# Patient Record
Sex: MALE | Race: WHITE | NOT HISPANIC OR LATINO | Employment: PART TIME | URBAN - METROPOLITAN AREA
[De-identification: names, ages, dates, MRNs, and addresses within clinical notes are randomized per-mention and may not be internally consistent; named-entity substitution may affect disease eponyms.]

---

## 2017-01-10 ENCOUNTER — HOSPITAL ENCOUNTER (EMERGENCY)
Facility: HOSPITAL | Age: 32
Discharge: HOME | End: 2017-01-10
Attending: EMERGENCY MEDICINE
Payer: COMMERCIAL

## 2017-01-10 VITALS
HEART RATE: 108 BPM | WEIGHT: 145 LBS | DIASTOLIC BLOOD PRESSURE: 63 MMHG | HEIGHT: 70 IN | SYSTOLIC BLOOD PRESSURE: 120 MMHG | OXYGEN SATURATION: 98 % | BODY MASS INDEX: 20.76 KG/M2 | TEMPERATURE: 98 F | RESPIRATION RATE: 18 BRPM

## 2017-01-10 DIAGNOSIS — R10.9 ABDOMINAL PAIN: Primary | ICD-10-CM

## 2017-01-10 LAB
BILIRUB UR QL STRIP: NEGATIVE
CLARITY UR: CLEAR
COLOR UR: YELLOW
COLOR, POC: YELLOW
GLUCOSE UR STRIP-MCNC: NEGATIVE MG/DL
HGB UR QL STRIP.AUTO: NEGATIVE
KETONES UR STRIP-MCNC: ABNORMAL MG/DL
LEUKOCYTE ESTERASE UR QL STRIP: NEGATIVE
NITRITE UR QL STRIP: NEGATIVE
PH UR STRIP.AUTO: 6.5 [PH] (ref 4.5–8)
PROT UR STRIP-MCNC: NEGATIVE MG/DL
SP GR UR STRIP.AUTO: 1.02 (ref 1–1.03)
UROBILINOGEN UR QL STRIP.AUTO: 0.2 E.U./DL

## 2017-01-10 PROCEDURE — 96374 THER/PROPH/DIAG INJ IV PUSH: CPT

## 2017-01-10 PROCEDURE — 81003 URINALYSIS AUTO W/O SCOPE: CPT

## 2017-01-10 PROCEDURE — 81002 URINALYSIS NONAUTO W/O SCOPE: CPT | Performed by: EMERGENCY MEDICINE

## 2017-01-10 PROCEDURE — 96361 HYDRATE IV INFUSION ADD-ON: CPT

## 2017-01-10 PROCEDURE — 99284 EMERGENCY DEPT VISIT MOD MDM: CPT

## 2017-01-10 RX ORDER — POLYETHYLENE GLYCOL 3350 17 G/17G
17 POWDER, FOR SOLUTION ORAL DAILY
Qty: 119 G | Refills: 0 | Status: SHIPPED | OUTPATIENT
Start: 2017-01-10 | End: 2018-10-16

## 2017-01-10 RX ORDER — ONDANSETRON 2 MG/ML
4 INJECTION INTRAMUSCULAR; INTRAVENOUS ONCE
Status: COMPLETED | OUTPATIENT
Start: 2017-01-10 | End: 2017-01-10

## 2017-01-10 RX ORDER — MAGNESIUM CARB/ALUMINUM HYDROX 105-160MG
296 TABLET,CHEWABLE ORAL ONCE
Status: COMPLETED | OUTPATIENT
Start: 2017-01-10 | End: 2017-01-10

## 2017-01-10 RX ORDER — CALCIUM CARBONATE 200(500)MG
1 TABLET,CHEWABLE ORAL AS NEEDED
COMMUNITY
End: 2019-01-16 | Stop reason: ALTCHOICE

## 2017-01-10 RX ADMIN — SODIUM CHLORIDE 1000 ML: 0.9 INJECTION, SOLUTION INTRAVENOUS at 06:09

## 2017-01-10 RX ADMIN — ONDANSETRON 4 MG: 2 INJECTION INTRAMUSCULAR; INTRAVENOUS at 06:08

## 2017-01-10 RX ADMIN — MAGNESIUM CITRATE 296 ML: 1.75 LIQUID ORAL at 07:12

## 2017-09-28 ENCOUNTER — ALLSCRIPTS OFFICE VISIT (OUTPATIENT)
Dept: OTHER | Facility: OTHER | Age: 32
End: 2017-09-28

## 2017-09-28 DIAGNOSIS — Z00.00 ENCOUNTER FOR GENERAL ADULT MEDICAL EXAMINATION WITHOUT ABNORMAL FINDINGS: ICD-10-CM

## 2017-09-28 LAB
BILIRUB UR QL STRIP: NORMAL
CLARITY UR: NORMAL
COLOR UR: YELLOW
GLUCOSE (HISTORICAL): NORMAL
HGB UR QL STRIP.AUTO: NORMAL
KETONES UR STRIP-MCNC: NORMAL MG/DL
LEUKOCYTE ESTERASE UR QL STRIP: NORMAL
NITRITE UR QL STRIP: NORMAL
PH UR STRIP.AUTO: 0.5 [PH]
PROT UR STRIP-MCNC: NORMAL MG/DL
SP GR UR STRIP.AUTO: 1
UROBILINOGEN UR QL STRIP.AUTO: 0.2

## 2017-10-04 ENCOUNTER — LAB CONVERSION - ENCOUNTER (OUTPATIENT)
Dept: OTHER | Facility: OTHER | Age: 32
End: 2017-10-04

## 2017-10-04 ENCOUNTER — GENERIC CONVERSION - ENCOUNTER (OUTPATIENT)
Dept: OTHER | Facility: OTHER | Age: 32
End: 2017-10-04

## 2017-10-04 LAB
A/G RATIO (HISTORICAL): 1.9 (CALC) (ref 1–2.5)
ALBUMIN SERPL BCP-MCNC: 4.8 G/DL (ref 3.6–5.1)
ALP SERPL-CCNC: 45 U/L (ref 40–115)
ALT SERPL W P-5'-P-CCNC: 29 U/L (ref 9–46)
AST SERPL W P-5'-P-CCNC: 23 U/L (ref 10–40)
BASOPHILS # BLD AUTO: 0.6 %
BASOPHILS # BLD AUTO: 38 CELLS/UL (ref 0–200)
BILIRUB SERPL-MCNC: 0.7 MG/DL (ref 0.2–1.2)
BUN SERPL-MCNC: 19 MG/DL (ref 7–25)
BUN/CREA RATIO (HISTORICAL): NORMAL (CALC) (ref 6–22)
CALCIUM SERPL-MCNC: 10.1 MG/DL (ref 8.6–10.3)
CHLORIDE SERPL-SCNC: 101 MMOL/L (ref 98–110)
CHOLEST SERPL-MCNC: 168 MG/DL
CHOLEST/HDLC SERPL: 2.6 (CALC)
CO2 SERPL-SCNC: 27 MMOL/L (ref 20–31)
CREAT SERPL-MCNC: 0.86 MG/DL (ref 0.6–1.35)
DEPRECATED RDW RBC AUTO: 12 % (ref 11–15)
EGFR AFRICAN AMERICAN (HISTORICAL): 136 ML/MIN/1.73M2
EGFR-AMERICAN CALC (HISTORICAL): 117 ML/MIN/1.73M2
EOSINOPHIL # BLD AUTO: 1.9 %
EOSINOPHIL # BLD AUTO: 120 CELLS/UL (ref 15–500)
GAMMA GLOBULIN (HISTORICAL): 2.5 G/DL (CALC) (ref 1.9–3.7)
GLUCOSE (HISTORICAL): 96 MG/DL (ref 65–99)
HCT VFR BLD AUTO: 44.4 % (ref 38.5–50)
HDLC SERPL-MCNC: 64 MG/DL
HGB BLD-MCNC: 15.2 G/DL (ref 13.2–17.1)
LDL CHOLESTEROL (HISTORICAL): 87 MG/DL (CALC)
LYMPHOCYTES # BLD AUTO: 1701 CELLS/UL (ref 850–3900)
LYMPHOCYTES # BLD AUTO: 27 %
MCH RBC QN AUTO: 30.3 PG (ref 27–33)
MCHC RBC AUTO-ENTMCNC: 34.2 G/DL (ref 32–36)
MCV RBC AUTO: 88.6 FL (ref 80–100)
MONOCYTES # BLD AUTO: 580 CELLS/UL (ref 200–950)
MONOCYTES (HISTORICAL): 9.2 %
NEUTROPHILS # BLD AUTO: 3862 CELLS/UL (ref 1500–7800)
NEUTROPHILS # BLD AUTO: 61.3 %
NON-HDL-CHOL (CHOL-HDL) (HISTORICAL): 104 MG/DL (CALC)
PLATELET # BLD AUTO: 239 THOUSAND/UL (ref 140–400)
PMV BLD AUTO: 11.7 FL (ref 7.5–12.5)
POTASSIUM SERPL-SCNC: 4.1 MMOL/L (ref 3.5–5.3)
RBC # BLD AUTO: 5.01 MILLION/UL (ref 4.2–5.8)
SODIUM SERPL-SCNC: 138 MMOL/L (ref 135–146)
TOTAL PROTEIN (HISTORICAL): 7.3 G/DL (ref 6.1–8.1)
TRIGL SERPL-MCNC: 79 MG/DL
TSH SERPL DL<=0.05 MIU/L-ACNC: 1.51 MIU/L (ref 0.4–4.5)
WBC # BLD AUTO: 6.3 THOUSAND/UL (ref 3.8–10.8)

## 2018-01-14 VITALS
WEIGHT: 148 LBS | TEMPERATURE: 97.6 F | SYSTOLIC BLOOD PRESSURE: 132 MMHG | DIASTOLIC BLOOD PRESSURE: 80 MMHG | HEART RATE: 72 BPM | RESPIRATION RATE: 16 BRPM | BODY MASS INDEX: 21.19 KG/M2 | HEIGHT: 70 IN

## 2018-01-16 NOTE — RESULT NOTES
Discussion/Summary   All recent blood test was normal     Verified Results  (1) LIPID PANEL, FASTING [id number] 05:14HV [facility]     Test Name Result Flag Reference   CHOLESTEROL, TOTAL 168 mg/dL  <200   HDL CHOLESTEROL 64 mg/dL  >92   TRIGLICERIDES 79 mg/dL  <067   LDL-CHOLESTEROL 87 mg/dL (calc)     Reference range: <100     Desirable range <100 mg/dL for patients with CHD or  diabetes and <70 mg/dL for diabetic patients with  known heart disease  LDL-C is now calculated using the Abhilash-Waite   calculation, which is a validated novel method providing   better accuracy than the Friedewald equation in the   estimation of LDL-C  Annette Thakur  Henrene Batman  3177;339(62): 4380-1783   (http://[employer]/faq/QRV951)   CHOL/HDLC RATIO 2 6 (calc)  <5 0   NON HDL CHOLESTEROL 104 mg/dL (calc)  <130   For patients with diabetes plus 1 major ASCVD risk   factor, treating to a non-HDL-C goal of <100 mg/dL   (LDL-C of <70 mg/dL) is considered a therapeutic   option  (1) COMPREHENSIVE METABOLIC PANEL [id number] 78:95OI [facility]     Test Name Result Flag Reference   GLUCOSE 96 mg/dL  65-99   Fasting reference interval   UREA NITROGEN (BUN) 19 mg/dL  7-25   CREATININE 0 86 mg/dL  0 60-1 35   eGFR NON-AFR   AMERICAN 117 mL/min/1 73m2  > OR = 60   eGFR AFRICAN AMERICAN 136 mL/min/1 73m2  > OR = 60   BUN/CREATININE RATIO   7-50   NOT APPLICABLE (calc)   SODIUM 138 mmol/L  135-146   POTASSIUM 4 1 mmol/L  3 5-5 3   CHLORIDE 101 mmol/L     CARBON DIOXIDE 27 mmol/L  20-31   CALCIUM 10 1 mg/dL  8 6-10 3   PROTEIN, TOTAL 7 3 g/dL  6 1-8 1   ALBUMIN 4 8 g/dL  3 6-5 1   GLOBULIN 2 5 g/dL (calc)  1 9-3 7   ALBUMIN/GLOBULIN RATIO 1 9 (calc)  1 0-2 5   BILIRUBIN, TOTAL 0 7 mg/dL  0 2-1 2   ALKALINE PHOSPHATASE 45 U/L     AST 23 U/L  10-40   ALT 29 U/L  9-46     (1) CBC/PLT/DIFF [id number] 01:63QT [facility]     Test Name Result Flag Reference   WHITE BLOOD CELL COUNT 6 3 Thousand/uL  3 8-10 8 RED BLOOD CELL COUNT 5 01 Million/uL  4 20-5 80   HEMOGLOBIN 15 2 g/dL  13 2-17 1   HEMATOCRIT 44 4 %  38 5-50 0   MCV 88 6 fL  80 0-100 0   MCH 30 3 pg  27 0-33 0   MCHC 34 2 g/dL  32 0-36 0   RDW 12 0 %  11 0-15 0   PLATELET COUNT 646 Thousand/uL  140-400   ABSOLUTE NEUTROPHILS 3862 cells/uL  7341-6966   ABSOLUTE LYMPHOCYTES 1701 cells/uL  850-3900   ABSOLUTE MONOCYTES 580 cells/uL  200-950   ABSOLUTE EOSINOPHILS 120 cells/uL     ABSOLUTE BASOPHILS 38 cells/uL  0-200   NEUTROPHILS 61 3 %     LYMPHOCYTES 27 0 %     MONOCYTES 9 2 %     EOSINOPHILS 1 9 %     BASOPHILS 0 6 %     MPV 11 7 fL  7 5-12 5     (Q) TSH, 3RD GENERATION [id number] 04:36BN Tye Watkins   REPORT COMMENT:  FASTING:YES     Test Name Result Flag Reference   TSH 1 51 mIU/L  0 40-4 50

## 2018-01-17 NOTE — PROGRESS NOTES
Assessment    1  Encounter for preventive health examination (V70 0) (Z00 00)    Plan  Health Maintenance    · (1) CBC/PLT/DIFF; Status:Active; Requested for:[id number];    · (1) COMPREHENSIVE METABOLIC PANEL; Status:Active; Requested for:15Vrb1110;    · (1) LIPID PANEL, FASTING; Status:Active; Requested for:18Xxf9715;    · (1) TSH; Status:Active; Requested for:28Hla0958;    · Urine Dip Automated- POC; Status:Complete;   Done: 49PNY4873 01:01PM    Discussion/Summary    Well adult  Patient appears to be in stable health  He will obtain blood work for further evaluation  We'll make further recommendations pending results of tests  Chief Complaint  Patient is here for Physical Exam  Patient has no current medications  History of Present Illness  HPI: Patient denies any recent illness  He does not obtain a regular form of exercise but does have a physical job working for [employer] that involved a lot of lifting and standing and walking  Review of Systems    Constitutional: No fever or chills, feels well, no tiredness, no recent weight gain or weight loss  Eyes: No complaints of eye pain, no red eyes, no discharge from eyes, no itchy eyes  ENT: no complaints of earache, no hearing loss, no nosebleeds, no nasal discharge, no sore throat, no hoarseness  Cardiovascular: No complaints of slow heart rate, no fast heart rate, no chest pain, no palpitations, no leg claudication, no lower extremity  Respiratory: No complaints of shortness of breath, no wheezing, no cough, no SOB on exertion, no orthopnea or PND  Gastrointestinal: No complaints of abdominal pain, no constipation, no nausea or vomiting, no diarrhea or bloody stools  Genitourinary: No complaints of dysuria, no incontinence, no hesitancy, no nocturia, no genital lesion, no testicular pain  Musculoskeletal: No complaints of arthralgia, no myalgias, no joint swelling or stiffness, no limb pain or swelling     Integumentary: No complaints of skin rash or skin lesions, no itching, no skin wound, no dry skin  Active Problems    1  Acute sinusitis (461 9) (J01 90)   2  Acute upper respiratory infection (465 9) (J06 9)   3  Paresthesia (782 0) (R20 2)   4  Right wrist pain (719 43) (M25 531)    Family History  Mother    · Family history of Living and Healthy  Father    · Family history of Living and Healthy  Maternal Grandmother    · Family history of Cancer  Paternal Grandmother    · Family history of Diabetes Mellitus (V18 0)    Social History    · Never a smoker    Allergies    1  No Known Drug Allergies    Vitals   Recorded: 19GIM0767 12:59PM   Temperature 97 6 F   Heart Rate 72   Respiration 16   Systolic 902   Diastolic 80   Height 5 ft 10 in   Weight 148 lb    BMI Calculated 21 24   BSA Calculated 1 84     Physical Exam    Constitutional   General appearance: No acute distress, well appearing and well nourished  Ears, Nose, Mouth, and Throat   External inspection of ears and nose: Normal     Otoscopic examination: Tympanic membranes translucent with normal light reflex  Canals patent without erythema  Hearing: Normal     Oropharynx: Normal with no erythema, edema, exudate or lesions  Pulmonary   Respiratory effort: No increased work of breathing or signs of respiratory distress  Auscultation of lungs: Clear to auscultation  Cardiovascular   Auscultation of heart: Normal rate and rhythm, normal S1 and S2, no murmurs  Carotid pulses: 2+ bilaterally  Examination of extremities for edema and/or varicosities: Normal     Abdomen   Abdomen: Non-tender, no masses  Liver and spleen: No hepatomegaly or splenomegaly  Lymphatic   Palpation of lymph nodes in neck: No lymphadenopathy  Musculoskeletal   Gait and station: Normal     Skin   Skin and subcutaneous tissue: Normal without rashes or lesions         Results/Data  Urine Dip Automated- POC [id number] 27:85CZ Sergey Lees     Test Name Result Flag Reference   Color Yellow Clarity Transparent     Leukocytes neg     Nitrite neg     Blood neg     Bilirubin neg     Urobilinogen 0 2     Protein neg     Ph 0 5     Specific Gravity 1 000     Ketone neg     Glucose neg         Signatures   Electronically signed by : AURELIA Hills ; Sep 28 0395  1:18PM EST                       (Author)

## 2018-10-16 ENCOUNTER — OFFICE VISIT (OUTPATIENT)
Dept: FAMILY MEDICINE CLINIC | Facility: CLINIC | Age: 33
End: 2018-10-16
Payer: COMMERCIAL

## 2018-10-16 VITALS
HEART RATE: 60 BPM | SYSTOLIC BLOOD PRESSURE: 90 MMHG | DIASTOLIC BLOOD PRESSURE: 56 MMHG | HEIGHT: 70 IN | RESPIRATION RATE: 16 BRPM | TEMPERATURE: 97.7 F | WEIGHT: 139.4 LBS | BODY MASS INDEX: 19.96 KG/M2

## 2018-10-16 DIAGNOSIS — L65.9 HAIR LOSS: Primary | ICD-10-CM

## 2018-10-16 PROCEDURE — 1036F TOBACCO NON-USER: CPT | Performed by: FAMILY MEDICINE

## 2018-10-16 PROCEDURE — 3008F BODY MASS INDEX DOCD: CPT | Performed by: FAMILY MEDICINE

## 2018-10-16 PROCEDURE — 99212 OFFICE O/P EST SF 10 MIN: CPT | Performed by: FAMILY MEDICINE

## 2018-10-16 NOTE — PROGRESS NOTES
FAMILY PRACTICE OFFICE VISIT       NAME: Nohemy Alejandro  AGE: 29 y o  SEX: male       : 1985        MRN: [id number]    DATE: 10/16/2018  TIME: 3:38 PM    Assessment and Plan     Problem List Items Addressed This Visit     Hair loss - Primary    Relevant Orders    CBC    Comprehensive metabolic panel    TSH, 3rd generation    Sedimentation rate, automated    Vitamin B12        I had a long discussion with the patient regarding possible hair loss  He will obtain blood work as ordered for further evaluation  If blood test was within normal limits he was referred to Dr Abe Vaughn for dermatologic consultation  He was also given suggestion to try over-the-counter minoxidil shampoo  There are no Patient Instructions on file for this visit  Chief Complaint     Chief Complaint   Patient presents with    Alopecia     Patient is here c/o hair loss x's 1+ yrs  History of Present Illness     Patient has noticed several weeks to months history of thinning hair especially along his hairline  He states occasionally he gets some scalp itching but nothing on a regular basis  He has tried several over-the-counter shampoos with no relief in symptoms  He has not had any recent stressors in his life to attribute to the hair loss  He takes a multivitamin once daily over-the-counter but no other prescription medicines  Review of Systems   Review of Systems   Constitutional: Negative  HENT: Negative  Respiratory: Negative  Cardiovascular: Negative  Skin:        As per HPI       Active Problem List     Patient Active Problem List   Diagnosis    Hair loss       Past Medical History:  No past medical history on file  Past Surgical History:  No past surgical history on file      Family History:  Family History   Problem Relation Age of Onset    No Known Problems Mother     No Known Problems Father     Cancer Maternal Grandmother     Diabetes Paternal Grandmother Social History:  Social History     Social History    Marital status: Single     Spouse name: N/A    Number of children: N/A    Years of education: N/A     Occupational History     [employer]     Social History Main Topics    Smoking status: Never Smoker    Smokeless tobacco: Never Used    Alcohol use Yes      Comment: social    Drug use: Yes     Types: Marijuana    Sexual activity: Not on file     Other Topics Concern    Not on file     Social History Narrative    No narrative on file       Objective     Vitals:    10/16/18 1448   BP: 90/56   Pulse: 60   Resp: 16   Temp: 97 7 °F (36 5 °C)     Wt Readings from Last 3 Encounters:   10/16/18 63 2 kg (139 lb 6 4 oz)   09/28/17 67 1 kg (148 lb)   01/10/17 65 8 kg (145 lb)       Physical Exam   Constitutional: No distress  Skin:   Patient's scalp appear to be within normal limits with no dryness or lesions noted  He does have very long hair past shoulders however he does have thinning hair along the hairline anteriorly and bilateral temporal areas         Pertinent Laboratory/Diagnostic Studies:  Lab Results   Component Value Date    BUN 19 10/03/2017    CREATININE 0 86 10/03/2017    CALCIUM 10 1 10/03/2017     10/03/2017    K 4 1 10/03/2017    CO2 27 10/03/2017     10/03/2017     Lab Results   Component Value Date    ALT 29 10/03/2017    AST 23 10/03/2017    ALKPHOS 45 10/03/2017    BILITOT 0 7 10/03/2017       Lab Results   Component Value Date    WBC 6 3 10/03/2017    HGB 15 2 10/03/2017    HCT 44 4 10/03/2017    MCV 88 6 10/03/2017     10/03/2017       No results found for: TSH    Lab Results   Component Value Date    CHOL 168 10/03/2017     Lab Results   Component Value Date    TRIG 79 10/03/2017     Lab Results   Component Value Date    HDL 64 10/03/2017     No results found for: LDLCALC  No results found for: HGBA1C    Results for orders placed or performed in visit on 10/04/17   TSH, 3RD GENERATION (HISTORICAL)   Result Value Ref Range    TSH 3RD GENERATON 1 51 0 40 - 4 50 mIU/L   COMPREHENSIVE METABOLIC PANEL (HISTORICAL)   Result Value Ref Range    Glucose 96 65 - 99 mg/dL    BUN 19 7 - 25 mg/dL    Creatinine 0 86 0 60 - 1 35 mg/dL    EGFR-AMERICAN CALC 117 > OR = 60 mL/min/1 73m2    eGFR  136 > OR = 60 mL/min/1 73m2    BUN/CREA Ratio NOT APPLICABLE 6 - 22 (calc)    Sodium 138 135 - 146 mmol/L    Potassium 4 1 3 5 - 5 3 mmol/L    Chloride 101 98 - 110 mmol/L    CO2 27 20 - 31 mmol/L    Calcium 10 1 8 6 - 10 3 mg/dL    Total Protein 7 3 6 1 - 8 1 g/dL    Albumin 4 8 3 6 - 5 1 g/dL    GAMMA GLOBULIN 2 5 1 9 - 3 7 g/dL (calc)    A/G RATIO 1 9 1 0 - 2 5 (calc)    Total Bilirubin 0 7 0 2 - 1 2 mg/dL    Alkaline Phosphatase 45 40 - 115 U/L    AST 23 10 - 40 U/L    ALT 29 9 - 46 U/L   LIPID PANEL (HISTORICAL)   Result Value Ref Range    Cholesterol 168 <200 mg/dL    HDL 64 >40 mg/dL    Triglycerides 79 <150 mg/dL    LDL CHOLESTEROL 87 mg/dL (calc)    Chol/HDL Ratio 2 6 <5 0 (calc)    NON-HDL-CHOL (CHOL-HDL) 104 <130 mg/dL (calc)   CBC AND DIFFERENTIAL (HISTORICAL)   Result Value Ref Range    WBC 6 3 3 8 - 10 8 Thousand/uL    RBC 5 01 4 20 - 5 80 Million/uL    Hemoglobin 15 2 13 2 - 17 1 g/dL    Hematocrit 44 4 38 5 - 50 0 %    MCV 88 6 80 0 - 100 0 fL    MCH 30 3 27 0 - 33 0 pg    MCHC 34 2 32 0 - 36 0 g/dL    RDW 12 0 11 0 - 15 0 %    Platelets 290 529 - 509 Thousand/uL    Neutrophils Absolute 3862 1500 - 7800 cells/uL    Lymphocytes Absolute 1701 850 - 3900 cells/uL    Monocytes Absolute 580 200 - 950 cells/uL    Eosinophils Absolute 120 15 - 500 cells/uL    Basophils Absolute 38 0 - 200 cells/uL    Neutrophils Absolute 61 3 %    Lymphocytes Absolute 27 0 %    MONOCYTES 9 2 %    Eosinophils Absolute 1 9 %    Basophils Relative 0 6 %    MPV 11 7 7 5 - 12 5 fL       Orders Placed This Encounter   Procedures    CBC    Comprehensive metabolic panel    TSH, 3rd generation    Sedimentation rate, automated    Vitamin B12 ALLERGIES:  No Known Allergies    Current Medications     Current Outpatient Prescriptions   Medication Sig Dispense Refill    calcium carbonate (TUMS) 500 mg chewable tablet Chew 1 tablet daily       No current facility-administered medications for this visit            Health Maintenance     Health Maintenance   Topic Date Due    Depression Screening PHQ  1985    INFLUENZA VACCINE  07/01/2018    DTaP,Tdap,and Td Vaccines (2 - Td) 06/01/2019     Immunization History   Administered Date(s) Administered    Tdap 68/60/0160       Elie Bailey MD

## 2018-12-07 ENCOUNTER — OFFICE VISIT (OUTPATIENT)
Dept: FAMILY MEDICINE CLINIC | Facility: CLINIC | Age: 33
End: 2018-12-07
Payer: COMMERCIAL

## 2018-12-07 VITALS
WEIGHT: 136.2 LBS | HEART RATE: 76 BPM | DIASTOLIC BLOOD PRESSURE: 62 MMHG | RESPIRATION RATE: 16 BRPM | TEMPERATURE: 98.6 F | BODY MASS INDEX: 19.5 KG/M2 | SYSTOLIC BLOOD PRESSURE: 102 MMHG | HEIGHT: 70 IN

## 2018-12-07 DIAGNOSIS — Z28.21 VACCINATION REFUSED BY PATIENT: ICD-10-CM

## 2018-12-07 DIAGNOSIS — R63.4 WEIGHT LOSS: ICD-10-CM

## 2018-12-07 DIAGNOSIS — Z00.00 PE (PHYSICAL EXAM), ANNUAL: ICD-10-CM

## 2018-12-07 DIAGNOSIS — R00.2 PALPITATION: Primary | ICD-10-CM

## 2018-12-07 LAB
SL AMB  POCT GLUCOSE, UA: NORMAL
SL AMB LEUKOCYTE ESTERASE,UA: NORMAL
SL AMB POCT BILIRUBIN,UA: NORMAL
SL AMB POCT BLOOD,UA: NORMAL
SL AMB POCT CLARITY,UA: CLEAR
SL AMB POCT COLOR,UA: NORMAL
SL AMB POCT KETONES,UA: NORMAL
SL AMB POCT NITRITE,UA: NORMAL
SL AMB POCT PH,UA: 5
SL AMB POCT SPECIFIC GRAVITY,UA: 1.02
SL AMB POCT URINE PROTEIN: NORMAL
SL AMB POCT UROBILINOGEN: 0.2

## 2018-12-07 PROCEDURE — 81003 URINALYSIS AUTO W/O SCOPE: CPT | Performed by: FAMILY MEDICINE

## 2018-12-07 PROCEDURE — 93000 ELECTROCARDIOGRAM COMPLETE: CPT | Performed by: FAMILY MEDICINE

## 2018-12-07 PROCEDURE — 99214 OFFICE O/P EST MOD 30 MIN: CPT | Performed by: FAMILY MEDICINE

## 2018-12-07 PROCEDURE — 3008F BODY MASS INDEX DOCD: CPT | Performed by: FAMILY MEDICINE

## 2018-12-07 NOTE — PROGRESS NOTES
FAMILY PRACTICE OFFICE VISIT       NAME: Huber Hobbs  AGE: 34 y o  SEX: male       : 1985        MRN: [id number]    DATE: 2018  TIME: 2:44 PM    Assessment and Plan     Problem List Items Addressed This Visit     Weight loss    Relevant Orders    T3, free    T4, free    Palpitation - Primary    Relevant Orders    POCT ECG (Completed)      Other Visit Diagnoses     PE (physical exam), annual        Relevant Orders    POCT urine dip auto non-scope (Completed)    Vaccination refused by patient        Relevant Orders    PREFERRED: influenza vaccine, [id number], quadrivalent, recombinant, PF, 0 5 mL (FLUBLOK)        Patient will obtain blood work as ordered for further evaluation of his weight loss  I suspect the majority of his symptoms are related to anxiety even other are no specific triggers identified  There are no Patient Instructions on file for this visit  Chief Complaint     Chief Complaint   Patient presents with    Physical Exam     Pt is here for annual physical and has concerns about his thyroid     Weight Loss    Insomnia    Anxiety       History of Present Illness     Patient states he has been experiencing insomnia with difficulties with fractured sleep which causes anxiety  He has been working more hours during the holiday season at [address] [place] [address] however this schedule is not unusual for him and is similar to past years where he has not had a problem  He does not identify any other triggers at this time  He is a nonsmoker and does not drink alcohol  He has significantly cut back on his energy drinks and caffeine  He has lost approximately 10 lb in the last year  Review of Systems   Review of Systems   Constitutional: Positive for unexpected weight change  Respiratory: Negative  Cardiovascular: Positive for palpitations  Gastrointestinal: Negative  Psychiatric/Behavioral: Positive for sleep disturbance  The patient is nervous/anxious          Active Problem List     Patient Active Problem List   Diagnosis    Hair loss    Weight loss    Palpitation       Past Medical History:  History reviewed  No pertinent past medical history  Past Surgical History:  History reviewed  No pertinent surgical history  Family History:  Family History   Problem Relation Age of Onset    No Known Problems Mother     No Known Problems Father     Cancer Maternal Grandmother     Diabetes Paternal Grandmother        Social History:  Social History     Social History    Marital status: Single     Spouse name: N/A    Number of children: N/A    Years of education: N/A     Occupational History     [employer]     Social History Main Topics    Smoking status: Never Smoker    Smokeless tobacco: Never Used    Alcohol use No    Drug use: No    Sexual activity: Not on file     Other Topics Concern    Not on file     Social History Narrative    No narrative on file       Objective     Vitals:    12/07/18 1339   BP: 102/62   Pulse: 76   Resp: 16   Temp: 98 6 °F (37 °C)     Wt Readings from Last 3 Encounters:   12/07/18 61 8 kg (136 lb 3 2 oz)   10/16/18 63 2 kg (139 lb 6 4 oz)   09/28/17 67 1 kg (148 lb)       Physical Exam   Constitutional: He is oriented to person, place, and time  No distress  HENT:   Tympanic membranes within normal limits bilaterally   Neck: No thyromegaly present  Cardiovascular:   Regular rate and rhythm with no murmurs   Pulmonary/Chest:   Lungs are clear to auscultation without wheezes,rales, or rhonchi   Musculoskeletal: He exhibits no edema  Lymphadenopathy:     He has no cervical adenopathy  Neurological: He is alert and oriented to person, place, and time  No cranial nerve deficit  Skin: No rash noted  Psychiatric:   Patient in no acute distress but does appear overly focused on having a medical condition or thyroid disorder       EKG showed normal sinus rhythm at 69 beats per minute, normal axis, incomplete right bundle branch block, negative acute ischemic changes  Pertinent Laboratory/Diagnostic Studies:  Lab Results   Component Value Date    BUN 19 10/03/2017    CREATININE 0 86 10/03/2017    CALCIUM 10 1 10/03/2017     10/03/2017    K 4 1 10/03/2017    CO2 27 10/03/2017     10/03/2017     Lab Results   Component Value Date    ALT 29 10/03/2017    AST 23 10/03/2017    ALKPHOS 45 10/03/2017    BILITOT 0 7 10/03/2017       Lab Results   Component Value Date    WBC 6 3 10/03/2017    HGB 15 2 10/03/2017    HCT 44 4 10/03/2017    MCV 88 6 10/03/2017     10/03/2017       No results found for: TSH    Lab Results   Component Value Date    CHOL 168 10/03/2017     Lab Results   Component Value Date    TRIG 79 10/03/2017     Lab Results   Component Value Date    HDL 64 10/03/2017     No results found for: LDLCALC  No results found for: HGBA1C    Results for orders placed or performed in visit on 12/07/18   POCT urine dip auto non-scope   Result Value Ref Range     COLOR,UA abdoulaye     CLARITY,UA clear     SPECIFIC GRAVITY,UA 1 020      PH,UA 5 0     LEUKOCYTE ESTERASE,UA -     NITRITE,UA -     GLUCOSE, UA -     KETONES,UA 5+-     BILIRUBIN,UA -     BLOOD,UA -     POCT URINE PROTEIN 15+-     SL AMB POCT UROBILINOGEN 0 2        Orders Placed This Encounter   Procedures    PREFERRED: influenza vaccine, [id number], quadrivalent, recombinant, PF, 0 5 mL (FLUBLOK)    T3, free    T4, free    POCT urine dip auto non-scope    POCT ECG       ALLERGIES:  No Known Allergies    Current Medications     Current Outpatient Prescriptions   Medication Sig Dispense Refill    calcium carbonate (TUMS) 500 mg chewable tablet Chew 1 tablet as needed         No current facility-administered medications for this visit            Health Maintenance     Health Maintenance   Topic Date Due    INFLUENZA VACCINE  01/07/2019 (Originally 7/1/2018)    DTaP,Tdap,and Td Vaccines (2 - Td) 06/01/2019    Depression Screening PHQ  12/07/2019     Immunization History Administered Date(s) Administered    Tdap 41/90/7660       Mchugh MD [place]

## 2018-12-19 LAB
ALBUMIN SERPL-MCNC: 4.5 G/DL (ref 3.6–5.1)
ALBUMIN/GLOB SERPL: 1.7 (CALC) (ref 1–2.5)
ALP SERPL-CCNC: 48 U/L (ref 40–115)
ALT SERPL-CCNC: 27 U/L (ref 9–46)
AST SERPL-CCNC: 21 U/L (ref 10–40)
BILIRUB SERPL-MCNC: 0.8 MG/DL (ref 0.2–1.2)
BUN SERPL-MCNC: 14 MG/DL (ref 7–25)
BUN/CREAT SERPL: NORMAL (CALC) (ref 6–22)
CALCIUM SERPL-MCNC: 10 MG/DL (ref 8.6–10.3)
CHLORIDE SERPL-SCNC: 101 MMOL/L (ref 98–110)
CO2 SERPL-SCNC: 31 MMOL/L (ref 20–32)
CREAT SERPL-MCNC: 0.81 MG/DL (ref 0.6–1.35)
ERYTHROCYTE [DISTWIDTH] IN BLOOD BY AUTOMATED COUNT: 11.8 % (ref 11–15)
ERYTHROCYTE [SEDIMENTATION RATE] IN BLOOD BY WESTERGREN METHOD: 2 MM/H
GLOBULIN SER CALC-MCNC: 2.7 G/DL (CALC) (ref 1.9–3.7)
GLUCOSE SERPL-MCNC: 97 MG/DL (ref 65–99)
HCT VFR BLD AUTO: 45.2 % (ref 38.5–50)
HGB BLD-MCNC: 15.4 G/DL (ref 13.2–17.1)
MCH RBC QN AUTO: 30.4 PG (ref 27–33)
MCHC RBC AUTO-ENTMCNC: 34.1 G/DL (ref 32–36)
MCV RBC AUTO: 89.2 FL (ref 80–100)
PLATELET # BLD AUTO: 322 THOUSAND/UL (ref 140–400)
PMV BLD REES-ECKER: 11 FL (ref 7.5–12.5)
POTASSIUM SERPL-SCNC: 5 MMOL/L (ref 3.5–5.3)
PROT SERPL-MCNC: 7.2 G/DL (ref 6.1–8.1)
RBC # BLD AUTO: 5.07 MILLION/UL (ref 4.2–5.8)
SL AMB EGFR AFRICAN AMERICAN: 137 ML/MIN/1.73M2
SL AMB EGFR NON AFRICAN AMERICAN: 118 ML/MIN/1.73M2
SODIUM SERPL-SCNC: 138 MMOL/L (ref 135–146)
T3FREE SERPL-MCNC: 3.6 PG/ML (ref 2.3–4.2)
T4 FREE SERPL-MCNC: 1.2 NG/DL (ref 0.8–1.8)
TSH SERPL-ACNC: 1.89 MIU/L (ref 0.4–4.5)
VIT B12 SERPL-MCNC: 948 PG/ML (ref 200–1100)
WBC # BLD AUTO: 4.8 THOUSAND/UL (ref 3.8–10.8)

## 2018-12-24 ENCOUNTER — TELEPHONE (OUTPATIENT)
Dept: FAMILY MEDICINE CLINIC | Facility: CLINIC | Age: 33
End: 2018-12-24

## 2018-12-24 NOTE — TELEPHONE ENCOUNTER
----- Message from Malik Arroyo MD sent at 20/81/9671  7:35 AM EST -----  All recent blood test came back as normal

## 2018-12-26 ENCOUNTER — TELEPHONE (OUTPATIENT)
Dept: FAMILY MEDICINE CLINIC | Facility: CLINIC | Age: 33
End: 2018-12-26

## 2018-12-26 NOTE — TELEPHONE ENCOUNTER
----- Message from Bismark Ni MD sent at 81/10/5843  7:35 AM EST -----  All recent blood test came back as normal

## 2019-01-16 ENCOUNTER — OFFICE VISIT (OUTPATIENT)
Dept: FAMILY MEDICINE CLINIC | Facility: CLINIC | Age: 34
End: 2019-01-16
Payer: COMMERCIAL

## 2019-01-16 VITALS
HEART RATE: 70 BPM | BODY MASS INDEX: 19.9 KG/M2 | WEIGHT: 139 LBS | TEMPERATURE: 99.1 F | HEIGHT: 70 IN | RESPIRATION RATE: 16 BRPM | SYSTOLIC BLOOD PRESSURE: 100 MMHG | DIASTOLIC BLOOD PRESSURE: 60 MMHG

## 2019-01-16 DIAGNOSIS — F41.9 ANXIETY: ICD-10-CM

## 2019-01-16 DIAGNOSIS — G47.9 SLEEP DISTURBANCE: Primary | ICD-10-CM

## 2019-01-16 PROCEDURE — 3008F BODY MASS INDEX DOCD: CPT | Performed by: FAMILY MEDICINE

## 2019-01-16 PROCEDURE — 99213 OFFICE O/P EST LOW 20 MIN: CPT | Performed by: FAMILY MEDICINE

## 2019-01-16 RX ORDER — ESCITALOPRAM OXALATE 10 MG/1
10 TABLET ORAL DAILY
Qty: 30 TABLET | Refills: 5 | Status: SHIPPED | OUTPATIENT
Start: 2019-01-16

## 2019-01-16 NOTE — ASSESSMENT & PLAN NOTE
Anxiety  Patient was given a prescription to initiate Lexapro 10 mg 1 p o  Q a m   Add a long discussion with the patient regarding efficacy in treatment options for his condition  He will also consider counseling after he obtains [facility] home sleep study

## 2019-01-16 NOTE — PROGRESS NOTES
FAMILY PRACTICE OFFICE VISIT       NAME: Daryn Cassidy  AGE: 34 y o  SEX: male       : 1985        MRN: [id number]    DATE: 2019  TIME: 6:46 PM    Assessment and Plan     Problem List Items Addressed This Visit     Sleep disturbance - Primary     Sleep disturbance  Patient requested evaluation for his disturbed sleep pattern  He was given a prescription to schedule a home sleep study for further evaluation  We will make further recommendations pending results of test          Relevant Orders    Home Study    Anxiety     Anxiety  Patient was given a prescription to initiate Lexapro 10 mg 1 p o  Q a m   Add a long discussion with the patient regarding efficacy in treatment options for his condition  He will also consider counseling after he obtains [facility] home sleep study  Relevant Medications    escitalopram (LEXAPRO) 10 mg tablet            There are no Patient Instructions on file for this visit  Chief Complaint     Chief Complaint   Patient presents with    Insomnia     Patient has been having trouble sleeping and having panic attacks x 2 months  History of Present Illness     Patient states for the past 4-6 weeks he has noticed increased anxiety episodes especially at night he will wake in gasping and this slowly builds up to lead to occasional panic attacks  He states episodes may occurred 3 times a week  He does have some daytime anxiety as well which she relates to for continuity of his sleep  He is unaware of any triggers at this time to initiate symptoms  He does not snore at night but does feel he wakes up feeling short of breath suddenly        Review of Systems   Review of Systems   Constitutional: Positive for fatigue  Respiratory:        As per HPI   Cardiovascular: Negative  Gastrointestinal: Negative  Psychiatric/Behavioral: Positive for sleep disturbance  The patient is nervous/anxious          Active Problem List     Patient Active Problem List   Diagnosis    Hair loss    Weight loss    Palpitation    Sleep disturbance    Anxiety       Past Medical History:  No past medical history on file  Past Surgical History:  No past surgical history on file  Family History:  Family History   Problem Relation Age of Onset    No Known Problems Mother     No Known Problems Father     Cancer Maternal Grandmother     Diabetes Paternal Grandmother        Social History:  Social History     Social History    Marital status: Single     Spouse name: N/A    Number of children: N/A    Years of education: N/A     Occupational History     [employer]     Social History Main Topics    Smoking status: Never Smoker    Smokeless tobacco: Never Used    Alcohol use No    Drug use: No    Sexual activity: Not on file     Other Topics Concern    Not on file     Social History Narrative    No narrative on file       Objective     Vitals:    01/16/19 1416   BP: 100/60   Pulse: 70   Resp: 16   Temp: 99 1 °F (37 3 °C)     Wt Readings from Last 3 Encounters:   01/16/19 63 kg (139 lb)   12/07/18 61 8 kg (136 lb 3 2 oz)   10/16/18 63 2 kg (139 lb 6 4 oz)       Physical Exam   Constitutional: No distress  Psychiatric: He has a normal mood and affect   His behavior is normal  Judgment and thought content normal        Pertinent Laboratory/Diagnostic Studies:  Lab Results   Component Value Date    BUN 14 12/18/2018    CREATININE 0 86 10/03/2017    CALCIUM 10 0 12/18/2018     10/03/2017    K 5 0 12/18/2018    CO2 31 12/18/2018     12/18/2018     Lab Results   Component Value Date    ALT 29 10/03/2017    AST 23 10/03/2017    ALKPHOS 48 12/18/2018    BILITOT 0 7 10/03/2017       Lab Results   Component Value Date    WBC 4 8 12/18/2018    HGB 15 4 12/18/2018    HCT 45 2 12/18/2018    MCV 89 2 12/18/2018     12/18/2018       Lab Results   Component Value Date    TSH 1 89 12/18/2018       Lab Results   Component Value Date    CHOL 168 10/03/2017 Lab Results   Component Value Date    TRIG 79 10/03/2017     Lab Results   Component Value Date    HDL 64 10/03/2017     No results found for: LDLCALC  No results found for: HGBA1C    Results for orders placed or performed in visit on 12/18/18   Comprehensive metabolic panel   Result Value Ref Range    Glucose, Random 97 65 - 99 mg/dL    BUN 14 7 - 25 mg/dL    Creatinine 0 81 0 60 - 1 35 mg/dL    eGFR Non  118 > OR = 60 mL/min/1 73m2    SL AMB EGFR  137 > OR = 60 mL/min/1 73m2    SL AMB BUN/CREATININE RATIO NOT APPLICABLE 6 - 22 (calc)    Sodium 138 135 - 146 mmol/L    Potassium 5 0 3 5 - 5 3 mmol/L    Chloride 101 98 - 110 mmol/L    CO2 31 20 - 32 mmol/L    SL AMB CALCIUM 10 0 8 6 - 10 3 mg/dL    SL AMB PROTEIN, TOTAL 7 2 6 1 - 8 1 g/dL    Albumin 4 5 3 6 - 5 1 g/dL    Globulin 2 7 1 9 - 3 7 g/dL (calc)    Albumin/Globulin Ratio 1 7 1 0 - 2 5 (calc)    TOTAL BILIRUBIN 0 8 0 2 - 1 2 mg/dL    Alkaline Phosphatase 48 40 - 115 U/L    SL AMB AST 21 10 - 40 U/L    SL AMB ALT 27 9 - 46 U/L   Sedimentation rate, automated   Result Value Ref Range    Sedimentation Rate 2 < OR = 15 mm/h   CBC   Result Value Ref Range    White Blood Cell Count 4 8 3 8 - 10 8 Thousand/uL    Red Blood Cell Count 5 07 4 20 - 5 80 Million/uL    Hemoglobin 15 4 13 2 - 17 1 g/dL    HCT 45 2 38 5 - 50 0 %    MCV 89 2 80 0 - 100 0 fL    MCH 30 4 27 0 - 33 0 pg    MCHC 34 1 32 0 - 36 0 g/dL    RDW 11 8 11 0 - 15 0 %    Platelet Count 167 677 - 400 Thousand/uL    SL AMB MPV 11 0 7 5 - 12 5 fL   T4, free   Result Value Ref Range    Free t4 1 2 0 8 - 1 8 ng/dL   TSH, 3rd generation   Result Value Ref Range    TSH 1 89 0 40 - 4 50 mIU/L   Vitamin B12   Result Value Ref Range    Vitamin B-12 948 200 - 1,100 pg/mL   T3, free   Result Value Ref Range    Free T3 3 6 2 3 - 4 2 pg/mL       Orders Placed This Encounter   Procedures    Home Study       ALLERGIES:  No Known Allergies    Current Medications     Current Outpatient Prescriptions   Medication Sig Dispense Refill    escitalopram (LEXAPRO) 10 mg tablet Take 1 tablet (10 mg total) by mouth daily 30 tablet 5     No current facility-administered medications for this visit            Health Maintenance     Health Maintenance   Topic Date Due    INFLUENZA VACCINE  07/01/2018    DTaP,Tdap,and Td Vaccines (2 - Td) 06/01/2019    Depression Screening PHQ  12/07/2019     Immunization History   Administered Date(s) Administered    Tdap 30/95/0683       Jonelle Garsia MD

## 2019-01-16 NOTE — ASSESSMENT & PLAN NOTE
Sleep disturbance  Patient requested evaluation for his disturbed sleep pattern  He was given a prescription to schedule a home sleep study for further evaluation    We will make further recommendations pending results of test

## 2019-02-07 ENCOUNTER — TELEPHONE (OUTPATIENT)
Dept: SLEEP CENTER | Facility: CLINIC | Age: 34
End: 2019-02-07

## 2019-02-07 NOTE — TELEPHONE ENCOUNTER
----- Message from Tien Salguero MD sent at 2/2/2019  6:23 PM EST -----  Approved  ----- Message -----  From: Terry Tadeo: 2/1/2019  11:12 AM  To: Sleep Medicine [facility], #    PLEASE REVIEW FOR APPROVAL OR [phone]

## 2019-02-15 ENCOUNTER — HOSPITAL ENCOUNTER (EMERGENCY)
Facility: HOSPITAL | Age: 34
Discharge: HOME | End: 2019-02-15
Attending: EMERGENCY MEDICINE | Admitting: EMERGENCY MEDICINE
Payer: COMMERCIAL

## 2019-02-15 VITALS
BODY MASS INDEX: 19.08 KG/M2 | DIASTOLIC BLOOD PRESSURE: 67 MMHG | SYSTOLIC BLOOD PRESSURE: 119 MMHG | OXYGEN SATURATION: 98 % | WEIGHT: 133 LBS | RESPIRATION RATE: 18 BRPM | HEART RATE: 61 BPM | TEMPERATURE: 98.5 F

## 2019-02-15 DIAGNOSIS — K59.00 CONSTIPATION: Primary | ICD-10-CM

## 2019-02-15 PROCEDURE — 99283 EMERGENCY DEPT VISIT LOW MDM: CPT

## 2019-02-15 RX ORDER — POLYETHYLENE GLYCOL 3350 17 G/17G
34 POWDER, FOR SOLUTION ORAL ONCE
Status: COMPLETED | OUTPATIENT
Start: 2019-02-15 | End: 2019-02-15

## 2019-02-15 RX ORDER — POLYETHYLENE GLYCOL 3350 17 G/17G
17 POWDER, FOR SOLUTION ORAL 2 TIMES DAILY PRN
Qty: 30 EACH | Refills: 0 | Status: SHIPPED | OUTPATIENT
Start: 2019-02-15

## 2019-02-15 RX ADMIN — POLYETHYLENE GLYCOL 3350 34 G: 17 POWDER, FOR SOLUTION ORAL at 17:08

## 2019-02-15 NOTE — ED PROVIDER NOTES
History  Chief Complaint   Patient presents with    Constipation     Pt states that he has not had a full bowel movement for 2 weeks  Pt states that he has recently changes his diet and has been having constipation since  33 yo M presents to ED for constipation  Pt says less than a month ago changed his diet and started cutting out carbs  Pt says now for breakfast instead of eating sugary cereals, he has been eating villarreal, eggs, and sardines  Pt reports he has not had a substantial BM in over 2 wks  He has tried milk of mag x3 over the last 5 days and said he had a minimal amount of liquid stool output  Says he has minimal abdominal discomfort  No nausea/vomiting  No fever/chills  Still eating/drinking well  Denies any prior abdominal surgeries or history of abdominal problems  Prior to Admission Medications   Prescriptions Last Dose Informant Patient Reported? Taking?   escitalopram (LEXAPRO) 10 mg tablet   No Yes   Sig: Take 1 tablet (10 mg total) by mouth daily      Facility-Administered Medications: None       History reviewed  No pertinent past medical history  History reviewed  No pertinent surgical history  Family History   Problem Relation Age of Onset    No Known Problems Mother     No Known Problems Father     Cancer Maternal Grandmother     Diabetes Paternal Grandmother      I have reviewed and agree with the history as documented  Social History     Tobacco Use    Smoking status: Never Smoker    Smokeless tobacco: Never Used   Substance Use Topics    Alcohol use: No    Drug use: No        Review of Systems   Constitutional: Negative for chills, fatigue and fever  HENT: Negative for congestion, rhinorrhea, sore throat and trouble swallowing  Eyes: Negative for pain, discharge and visual disturbance  Respiratory: Negative for cough, chest tightness and shortness of breath  Cardiovascular: Negative for chest pain, palpitations and leg swelling     Gastrointestinal: Positive for abdominal pain and constipation  Negative for nausea and vomiting  Genitourinary: Negative for decreased urine volume, dysuria, frequency and urgency  Musculoskeletal: Negative for gait problem, neck pain and neck stiffness  Skin: Negative for color change, rash and wound  Neurological: Negative for dizziness, syncope, light-headedness and headaches  Physical Exam  ED Triage Vitals [02/15/19 1325]   Temperature Pulse Respirations Blood Pressure SpO2   98 5 °F (36 9 °C) 80 18 114/60 98 %      Temp Source Heart Rate Source Patient Position - Orthostatic VS BP Location FiO2 (%)   Oral Monitor Sitting Left arm --      Pain Score       2           Orthostatic Vital Signs  Vitals:    02/15/19 1325 02/15/19 1530   BP: 114/60 119/67   Pulse: 80 61   Patient Position - Orthostatic VS: Sitting Lying       Physical Exam   Constitutional: He is oriented to person, place, and time  He appears well-developed and well-nourished  No distress  HENT:   Head: Normocephalic and atraumatic  Mouth/Throat: Oropharynx is clear and moist    Eyes: Conjunctivae and EOM are normal  Right eye exhibits no discharge  Left eye exhibits no discharge  Neck: Normal range of motion  Neck supple  nontender   Cardiovascular: Normal rate, regular rhythm and intact distal pulses  Pulmonary/Chest: Effort normal and breath sounds normal  No stridor  No respiratory distress  Abdominal: Soft  Bowel sounds are normal  There is no tenderness  There is no rebound and no guarding  Genitourinary: Rectal exam shows no external hemorrhoid, no fissure and guaiac negative stool  Genitourinary Comments: Soft stool palpable   Musculoskeletal: Normal range of motion  He exhibits no edema or tenderness  Neurological: He is alert and oriented to person, place, and time  No sensory deficit  He exhibits normal muscle tone  Skin: Skin is warm and dry  Capillary refill takes less than 2 seconds     Nursing note and vitals reviewed  ED Medications  Medications   polyethylene glycol (MIRALAX) packet 34 g (34 g Oral Given 2/15/19 1708)       Diagnostic Studies  Results Reviewed     None                 No orders to display         Procedures  Procedures      Phone Consults  ED Phone Contact    ED Course                               MDM  Number of Diagnoses or Management Options  Constipation:   Diagnosis management comments: Enema and miralax in ED  Pt had 2 BMs in ED and feels better  Will discharge home with Rx for miralax  Encouraged fiber rich diet, plenty of fluids, and exercise  Follow up with PCP  Return precautions discussed  Disposition  Final diagnoses:   Constipation     Time reflects when diagnosis was documented in both MDM as applicable and the Disposition within this note     Time User Action Codes Description Comment    2/15/2019  5:43 PM Maru Murillo, [address] [K59 [id number]] Constipation       ED Disposition     ED Disposition Condition Date/Time Comment    Discharge Stable Fri Feb 15, 2019  5:43 PM Jaya Gonzales discharge to home/self care              Follow-up Information     Follow up With Specialties Details Why Contact Info Additional [address] Emergency Department Emergency Medicine Go to  As needed, If symptoms worsen [address]  [phone]  ED, [address] [place] [address], [address] Karl Dalton MD Family Medicine Schedule an appointment as soon as possible for a visit in 1 week As needed [address] [facility] [address]  [phone]             Discharge Medication List as of 2/15/2019  5:44 PM      START taking these medications    Details   polyethylene glycol (MIRALAX) 17 g packet Take 17 g by mouth 2 (two) times a day as needed (constipation), Starting Fri 2/15/2019, Print         CONTINUE these medications which have NOT CHANGED    Details   escitalopram (LEXAPRO) 10 mg tablet Take 1 tablet (10 mg total) by mouth daily, Starting Wed 1/16/2019, Print           No discharge procedures on file  ED Provider  Attending physically available and evaluated Vicky Buck  I managed the patient along with the ED Attending      Electronically Signed by         Jung Mendez MD  02/15/19 6631

## 2019-02-15 NOTE — ED NOTES
Resident updated on the pt feeling better and ready for d/c    Pt sitting at the bedside, dressed and awaiting d/c paperwork;      Rosi Ron RN  02/15/19 3250

## 2019-02-15 NOTE — ED ATTENDING ATTESTATION
Yue DUKE DO, saw and evaluated the patient  I have discussed the patient with the resident/non-physician practitioner and agree with the resident's/non-physician practitioner's findings, Plan of Care, and MDM as documented in the resident's/non-physician practitioner's note, except where noted  All available labs and Radiology studies were reviewed  At this point I agree with the current assessment done in the Emergency Department  I have conducted an independent evaluation of this patient a history and physical is as follows:      Critical Care Time  Procedures     34 yr old male with costipation for the last two weeks  Only a couple of small movements with MOM  Started new diet four weeks ago  Exm: no significant abd pain  Rectal with soft stool high in vault  Pln: Miralax and enema

## 2019-02-15 NOTE — ED NOTES
Pt reports he is feeling a little better and had 2 bowel movements        Yazmin Kim RN  02/15/19 4257

## 2019-03-08 ENCOUNTER — TELEPHONE (OUTPATIENT)
Dept: SLEEP CENTER | Facility: CLINIC | Age: 34
End: 2019-03-08

## 2019-03-08 NOTE — TELEPHONE ENCOUNTER
----- Message from Chi Lopes MD sent at 3/5/2019  4:50 PM EST -----  Approved  ----- Message -----  From: Kenny Estrella: 3/5/2019   4:03 PM  To: Sleep Medicine [facility] [place], #    PLEASE REVIEW FOR APPROVAL OR DENIAL AND WHY

## 2019-04-14 ENCOUNTER — HOSPITAL ENCOUNTER (OUTPATIENT)
Dept: SLEEP CENTER | Facility: CLINIC | Age: 34
Discharge: HOME | End: 2019-04-14
Payer: COMMERCIAL

## 2019-04-14 DIAGNOSIS — G47.9 SLEEP DISTURBANCE: ICD-10-CM

## 2019-04-14 PROCEDURE — G0399 HOME SLEEP TEST/TYPE 3 PORTA: HCPCS

## 2019-04-16 ENCOUNTER — TELEPHONE (OUTPATIENT)
Dept: FAMILY MEDICINE CLINIC | Facility: CLINIC | Age: 34
End: 2019-04-16

## 2019-11-18 ENCOUNTER — TELEPHONE (OUTPATIENT)
Dept: FAMILY MEDICINE CLINIC | Facility: CLINIC | Age: 34
End: 2019-11-18

## 2019-11-18 NOTE — TELEPHONE ENCOUNTER
Patient wants to know if he can get lab orders for blood work before is appt on Mon 11/25  Please call to advise

## 2019-11-20 DIAGNOSIS — Z00.00 PE (PHYSICAL EXAM), ANNUAL: Primary | ICD-10-CM

## 2019-11-28 LAB
ALBUMIN SERPL-MCNC: 4.6 G/DL (ref 3.6–5.1)
ALBUMIN/GLOB SERPL: 1.9 (CALC) (ref 1–2.5)
ALP SERPL-CCNC: 43 U/L (ref 40–115)
ALT SERPL-CCNC: 20 U/L (ref 9–46)
AST SERPL-CCNC: 20 U/L (ref 10–40)
BASOPHILS # BLD AUTO: 41 CELLS/UL (ref 0–200)
BASOPHILS NFR BLD AUTO: 0.8 %
BILIRUB SERPL-MCNC: 0.7 MG/DL (ref 0.2–1.2)
BUN SERPL-MCNC: 14 MG/DL (ref 7–25)
BUN/CREAT SERPL: NORMAL (CALC) (ref 6–22)
CALCIUM SERPL-MCNC: 9.8 MG/DL (ref 8.6–10.3)
CHLORIDE SERPL-SCNC: 101 MMOL/L (ref 98–110)
CHOLEST SERPL-MCNC: 173 MG/DL
CHOLEST/HDLC SERPL: 2.3 (CALC)
CO2 SERPL-SCNC: 29 MMOL/L (ref 20–32)
CREAT SERPL-MCNC: 0.86 MG/DL (ref 0.6–1.35)
EOSINOPHIL # BLD AUTO: 179 CELLS/UL (ref 15–500)
EOSINOPHIL NFR BLD AUTO: 3.5 %
ERYTHROCYTE [DISTWIDTH] IN BLOOD BY AUTOMATED COUNT: 11.7 % (ref 11–15)
GLOBULIN SER CALC-MCNC: 2.4 G/DL (CALC) (ref 1.9–3.7)
GLUCOSE SERPL-MCNC: 92 MG/DL (ref 65–99)
HCT VFR BLD AUTO: 43.8 % (ref 38.5–50)
HDLC SERPL-MCNC: 76 MG/DL
HGB BLD-MCNC: 14.6 G/DL (ref 13.2–17.1)
LDLC SERPL CALC-MCNC: 84 MG/DL (CALC)
LYMPHOCYTES # BLD AUTO: 1851 CELLS/UL (ref 850–3900)
LYMPHOCYTES NFR BLD AUTO: 36.3 %
MCH RBC QN AUTO: 30.4 PG (ref 27–33)
MCHC RBC AUTO-ENTMCNC: 33.3 G/DL (ref 32–36)
MCV RBC AUTO: 91.1 FL (ref 80–100)
MONOCYTES # BLD AUTO: 592 CELLS/UL (ref 200–950)
MONOCYTES NFR BLD AUTO: 11.6 %
NEUTROPHILS # BLD AUTO: 2438 CELLS/UL (ref 1500–7800)
NEUTROPHILS NFR BLD AUTO: 47.8 %
NONHDLC SERPL-MCNC: 97 MG/DL (CALC)
PLATELET # BLD AUTO: 275 THOUSAND/UL (ref 140–400)
PMV BLD REES-ECKER: 11.3 FL (ref 7.5–12.5)
POTASSIUM SERPL-SCNC: 4.5 MMOL/L (ref 3.5–5.3)
PROT SERPL-MCNC: 7 G/DL (ref 6.1–8.1)
RBC # BLD AUTO: 4.81 MILLION/UL (ref 4.2–5.8)
SL AMB EGFR AFRICAN AMERICAN: 133 ML/MIN/1.73M2
SL AMB EGFR NON AFRICAN AMERICAN: 115 ML/MIN/1.73M2
SODIUM SERPL-SCNC: 137 MMOL/L (ref 135–146)
TRIGL SERPL-MCNC: 54 MG/DL
TSH SERPL-ACNC: 2.08 MIU/L (ref 0.4–4.5)
WBC # BLD AUTO: 5.1 THOUSAND/UL (ref 3.8–10.8)

## 2019-11-29 ENCOUNTER — TELEPHONE (OUTPATIENT)
Dept: FAMILY MEDICINE CLINIC | Facility: CLINIC | Age: 34
End: 2019-11-29

## 2019-11-29 NOTE — TELEPHONE ENCOUNTER
----- Message from Riya Gannon MD sent at 37/47/5827  1:37 PM EST -----  All recent blood work stable for patient

## 2024-11-01 ENCOUNTER — OFFICE VISIT (OUTPATIENT)
Dept: FAMILY MEDICINE CLINIC | Facility: CLINIC | Age: 39
End: 2024-11-01
Payer: COMMERCIAL

## 2024-11-01 VITALS
WEIGHT: 147.6 LBS | BODY MASS INDEX: 21.13 KG/M2 | HEIGHT: 70 IN | OXYGEN SATURATION: 99 % | TEMPERATURE: 97.8 F | SYSTOLIC BLOOD PRESSURE: 120 MMHG | RESPIRATION RATE: 18 BRPM | HEART RATE: 72 BPM | DIASTOLIC BLOOD PRESSURE: 68 MMHG

## 2024-11-01 DIAGNOSIS — R53.83 OTHER FATIGUE: ICD-10-CM

## 2024-11-01 DIAGNOSIS — F14.10 COCAINE ABUSE (HCC): ICD-10-CM

## 2024-11-01 DIAGNOSIS — F41.9 ANXIETY: ICD-10-CM

## 2024-11-01 DIAGNOSIS — K21.9 GASTROESOPHAGEAL REFLUX DISEASE, UNSPECIFIED WHETHER ESOPHAGITIS PRESENT: ICD-10-CM

## 2024-11-01 DIAGNOSIS — R59.1 LYMPHADENOPATHY: ICD-10-CM

## 2024-11-01 DIAGNOSIS — Z00.00 ANNUAL PHYSICAL EXAM: Primary | ICD-10-CM

## 2024-11-01 DIAGNOSIS — Z78.9 ALCOHOL USE: ICD-10-CM

## 2024-11-01 PROCEDURE — 99385 PREV VISIT NEW AGE 18-39: CPT | Performed by: FAMILY MEDICINE

## 2024-11-01 PROCEDURE — 99204 OFFICE O/P NEW MOD 45 MIN: CPT | Performed by: FAMILY MEDICINE

## 2024-11-01 RX ORDER — PANTOPRAZOLE SODIUM 40 MG/1
40 TABLET, DELAYED RELEASE ORAL
Qty: 30 TABLET | Refills: 5 | Status: SHIPPED | OUTPATIENT
Start: 2024-11-01 | End: 2025-04-30

## 2024-11-01 NOTE — PROGRESS NOTES
Adult Annual Physical  Name: Karl Villa      : 1985      MRN: [id number]  Encounter Provider: Yoan Reis DO  Encounter Date: 2024   Encounter department: [facility]    Assessment & Plan  Annual physical exam    Orders:    Lipid panel; Future    CBC and Platelet; Future    Comprehensive metabolic panel; Future    TSH, 3rd generation with Free T4 reflex; Future    HIV 1/2 AG/AB w Reflex [facility] for 2 yr old and above; Future    Hepatitis C antibody; Future    Gastroesophageal reflux disease, unspecified whether esophagitis present  Worse with alcohol and cocaine use, advised against this. Recommend Protonix given concern for possible gastric ulcer and GI referral provided for possible endoscopy.  Orders:    pantoprazole (PROTONIX) 40 mg tablet; Take 1 tablet (40 mg total) by mouth daily before breakfast    Ambulatory Referral to Gastroenterology; Future    Lymphadenopathy  For a couple of months was having sharp pain in the bilateral anterior cervical and submandibular chain regions, none currently. Advised to follow up for US if pain or symptoms recur.       Anxiety  Feels manageable. Was prescribed Lexapro previously but never started it. Declines medications and counseling.         Other fatigue    Orders:    Testosterone, Free+Total LC/MS; Future    Vitamin B12; Future    Vitamin D 25 hydroxy; Future    Iron Panel (Includes Ferritin, Iron Sat%, Iron, and TIBC); Future    Cocaine abuse (HCC)  Daily use. Does not wish to stop, declines further assistance.       Alcohol use  Heavy use, 6 drinks daily, usually with cocaine. Does not wish to stop, declines further assistance.       Immunizations and preventive care screenings were discussed with patient today. Appropriate education was printed on patient's after visit summary.    Counseling:  Alcohol/drug use: discussed moderation in alcohol intake, the recommendations for healthy alcohol use, and avoidance of  "illicit drug use.    Cocaine - could stop if he wanted do, nearly daily use. Recommend against use, he understands it is harmful but does not want to stop at this time.   Alcohol - drinking heavier recently since being on vacation, feels in control of it. 6 drinks per day 9.5% alcohol beers    Dental Health: discussed importance of regular tooth brushing, flossing, and dental visits.  Sexual health: discussed sexually transmitted diseases, partner selection, use of condoms, avoidance of unintended pregnancy, and contraceptive alternatives.  Exercise: the importance of regular exercise/physical activity was discussed. Recommend exercise 3-5 times per week for at least 30 minutes.          History of Present Illness       Concerned he might have a stomach ulcer. Having abdominal pain worse with eating, burning sensation. Drinking and does cocaine, having worsening acid reflux and vomiting some red material but did have some tomato sauce previously. Had a stomach ulcer about 11 years ago. Was drinking and took an Ibuprofen, had nausea, vomiting, GERD symptoms at that time.    Has had stinging pains in his lymph nodes in his neck. For a couple of months pain was consistent, none currently.         Adult Annual Physical:  Patient presents for annual physical.     Diet and Physical Activity:  - Diet/Nutrition: poor diet. fast food  - Exercise: moderate cardiovascular exercise.    General Health:  - Sleep: sleeps well.  - Hearing: normal hearing bilateral ears.  - Vision: most recent eye exam > 1 year ago and no vision problems.  - Dental: no dental visits for > 1 year.    Review of Systems      Objective     /68 (BP Location: Left arm, Patient Position: Sitting, Cuff Size: Standard)   Pulse 72   Temp 97.8 °F (36.6 °C) (Temporal)   Resp 18   Ht 5' 10\" (1.778 m)   Wt 67 kg (147 lb 9.6 oz)   SpO2 99%   BMI 21.18 kg/m²     Physical Exam  Vitals reviewed.   Constitutional:       Appearance: He is well-developed. "   HENT:      Head: Normocephalic.      Mouth/Throat:      Mouth: Mucous membranes are moist.   Eyes:      Extraocular Movements: Extraocular movements intact.   Cardiovascular:      Rate and Rhythm: Normal rate and regular rhythm.      Heart sounds: Normal heart sounds.   Pulmonary:      Effort: Pulmonary effort is normal.      Breath sounds: Normal breath sounds.   Abdominal:      General: Abdomen is flat.      Palpations: Abdomen is soft.      Tenderness: There is no abdominal tenderness.   Skin:     General: Skin is warm and dry.   Neurological:      Mental Status: He is alert.   Psychiatric:         Mood and Affect: Mood is depressed.

## 2024-11-01 NOTE — ASSESSMENT & PLAN NOTE
Heavy use, 6 drinks daily, usually with cocaine. Does not wish to stop, declines further assistance.

## 2024-11-01 NOTE — ASSESSMENT & PLAN NOTE
Feels manageable. Was prescribed Lexapro previously but never started it. Declines medications and counseling.

## 2024-11-01 NOTE — ASSESSMENT & PLAN NOTE
Worse with alcohol and cocaine use, advised against this. Recommend Protonix given concern for possible gastric ulcer and GI referral provided for possible endoscopy.  Orders:    pantoprazole (PROTONIX) 40 mg tablet; Take 1 tablet (40 mg total) by mouth daily before breakfast    Ambulatory Referral to Gastroenterology; Future

## 2024-11-19 ENCOUNTER — APPOINTMENT (OUTPATIENT)
Dept: LAB | Facility: CLINIC | Age: 39
End: 2024-11-19
Payer: COMMERCIAL

## 2024-11-19 DIAGNOSIS — Z00.00 ANNUAL PHYSICAL EXAM: ICD-10-CM

## 2024-11-19 DIAGNOSIS — R53.83 OTHER FATIGUE: ICD-10-CM

## 2024-11-19 LAB
25(OH)D3 SERPL-MCNC: 32.8 NG/ML (ref 30–100)
ALBUMIN SERPL BCG-MCNC: 4.3 G/DL (ref 3.5–5)
ALP SERPL-CCNC: 55 U/L (ref 34–104)
ALT SERPL W P-5'-P-CCNC: 30 U/L (ref 7–52)
ANION GAP SERPL CALCULATED.3IONS-SCNC: 8 MMOL/L (ref 4–13)
AST SERPL W P-5'-P-CCNC: 33 U/L (ref 13–39)
BILIRUB SERPL-MCNC: 0.81 MG/DL (ref 0.2–1)
BUN SERPL-MCNC: 18 MG/DL (ref 5–25)
CALCIUM SERPL-MCNC: 9.3 MG/DL (ref 8.4–10.2)
CHLORIDE SERPL-SCNC: 102 MMOL/L (ref 96–108)
CHOLEST SERPL-MCNC: 182 MG/DL (ref ?–200)
CO2 SERPL-SCNC: 30 MMOL/L (ref 21–32)
CREAT SERPL-MCNC: 0.9 MG/DL (ref 0.6–1.3)
ERYTHROCYTE [DISTWIDTH] IN BLOOD BY AUTOMATED COUNT: 12.6 % (ref 11.6–15.1)
GFR SERPL CREATININE-BSD FRML MDRD: 108 ML/MIN/1.73SQ M
GLUCOSE P FAST SERPL-MCNC: 99 MG/DL (ref 65–99)
HCT VFR BLD AUTO: 47.3 % (ref 36.5–49.3)
HCV AB SER QL: NORMAL
HDLC SERPL-MCNC: 82 MG/DL
HGB BLD-MCNC: 15.1 G/DL (ref 12–17)
HIV 1+2 AB+HIV1 P24 AG SERPL QL IA: NORMAL
HIV 2 AB SERPL QL IA: NORMAL
HIV1 AB SERPL QL IA: NORMAL
HIV1 P24 AG SERPL QL IA: NORMAL
LDLC SERPL CALC-MCNC: 85 MG/DL (ref 0–100)
MCH RBC QN AUTO: 31.5 PG (ref 26.8–34.3)
MCHC RBC AUTO-ENTMCNC: 31.9 G/DL (ref 31.4–37.4)
MCV RBC AUTO: 99 FL (ref 82–98)
NONHDLC SERPL-MCNC: 100 MG/DL
PLATELET # BLD AUTO: 345 THOUSANDS/UL (ref 149–390)
PMV BLD AUTO: 11.1 FL (ref 8.9–12.7)
POTASSIUM SERPL-SCNC: 4.3 MMOL/L (ref 3.5–5.3)
PROT SERPL-MCNC: 7.2 G/DL (ref 6.4–8.4)
RBC # BLD AUTO: 4.8 MILLION/UL (ref 3.88–5.62)
SODIUM SERPL-SCNC: 140 MMOL/L (ref 135–147)
TRIGL SERPL-MCNC: 77 MG/DL (ref ?–150)
TSH SERPL DL<=0.05 MIU/L-ACNC: 1.02 UIU/ML (ref 0.45–4.5)
VIT B12 SERPL-MCNC: 406 PG/ML (ref 180–914)
WBC # BLD AUTO: 6.04 THOUSAND/UL (ref 4.31–10.16)

## 2024-11-19 PROCEDURE — 82306 VITAMIN D 25 HYDROXY: CPT

## 2024-11-19 PROCEDURE — 85027 COMPLETE CBC AUTOMATED: CPT

## 2024-11-19 PROCEDURE — 82728 ASSAY OF FERRITIN: CPT

## 2024-11-19 PROCEDURE — 83540 ASSAY OF IRON: CPT

## 2024-11-19 PROCEDURE — 80053 COMPREHEN METABOLIC PANEL: CPT

## 2024-11-19 PROCEDURE — 82607 VITAMIN B-12: CPT

## 2024-11-19 PROCEDURE — 84443 ASSAY THYROID STIM HORMONE: CPT

## 2024-11-19 PROCEDURE — 87389 HIV-1 AG W/HIV-1&-2 AB AG IA: CPT

## 2024-11-19 PROCEDURE — 36415 COLL VENOUS BLD VENIPUNCTURE: CPT

## 2024-11-19 PROCEDURE — 80061 LIPID PANEL: CPT

## 2024-11-19 PROCEDURE — 86803 HEPATITIS C AB TEST: CPT

## 2024-11-19 PROCEDURE — 83550 IRON BINDING TEST: CPT

## 2024-11-20 LAB
FERRITIN SERPL-MCNC: 104 NG/ML (ref 24–336)
IRON SATN MFR SERPL: 53 % (ref 15–50)
IRON SERPL-MCNC: 183 UG/DL (ref 50–212)
TIBC SERPL-MCNC: 344 UG/DL (ref 250–450)
UIBC SERPL-MCNC: 161 UG/DL (ref 155–355)

## 2024-12-03 ENCOUNTER — HOSPITAL ENCOUNTER (EMERGENCY)
Facility: HOSPITAL | Age: 39
Discharge: HOME | End: 2024-12-03
Attending: EMERGENCY MEDICINE
Payer: COMMERCIAL

## 2024-12-03 ENCOUNTER — APPOINTMENT (EMERGENCY)
Dept: RADIOLOGY | Facility: HOSPITAL | Age: 39
End: 2024-12-03
Payer: COMMERCIAL

## 2024-12-03 VITALS
DIASTOLIC BLOOD PRESSURE: 62 MMHG | OXYGEN SATURATION: 98 % | SYSTOLIC BLOOD PRESSURE: 129 MMHG | HEART RATE: 87 BPM | RESPIRATION RATE: 18 BRPM | TEMPERATURE: 98.4 F

## 2024-12-03 DIAGNOSIS — S22.42XA CLOSED FRACTURE OF MULTIPLE RIBS OF LEFT SIDE, INITIAL ENCOUNTER: Primary | ICD-10-CM
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
## 2024-12-03 PROCEDURE — 99284 EMERGENCY DEPT VISIT MOD MDM: CPT | Performed by: EMERGENCY MEDICINE

## 2024-12-03 PROCEDURE — 71101 X-RAY EXAM UNILAT RIBS/CHEST: CPT

## 2024-12-03 PROCEDURE — 99283 EMERGENCY DEPT VISIT LOW MDM: CPT

## 2024-12-03 RX ORDER — LIDOCAINE 50 MG/G
1 PATCH TOPICAL DAILY
Qty: 14 PATCH | Refills: 0 | Status: SHIPPED | OUTPATIENT
Start: 2024-12-03

## 2024-12-03 RX ORDER — LIDOCAINE 50 MG/G
1 PATCH TOPICAL ONCE
Status: DISCONTINUED | OUTPATIENT
Start: 2024-12-03 | End: 2024-12-03 | Stop reason: HOSPADM

## 2024-12-03 RX ORDER — IBUPROFEN 400 MG/1
400 TABLET, FILM COATED ORAL ONCE
Status: COMPLETED | OUTPATIENT
Start: 2024-12-03 | End: 2024-12-03

## 2024-12-03 RX ORDER — SENNOSIDES 8.6 MG
650 CAPSULE ORAL EVERY 8 HOURS PRN
Qty: 30 TABLET | Refills: 0 | Status: SHIPPED | OUTPATIENT
Start: 2024-12-03

## 2024-12-03 RX ORDER — IBUPROFEN 400 MG/1
400 TABLET, FILM COATED ORAL EVERY 6 HOURS PRN
Qty: 30 TABLET | Refills: 0 | Status: SHIPPED | OUTPATIENT
Start: 2024-12-03

## 2024-12-03 RX ORDER — ACETAMINOPHEN 325 MG/1
975 TABLET ORAL ONCE
Status: COMPLETED | OUTPATIENT
Start: 2024-12-03 | End: 2024-12-03

## 2024-12-03 RX ADMIN — IBUPROFEN 400 MG: 400 TABLET, FILM COATED ORAL at 19:27

## 2024-12-03 RX ADMIN — LIDOCAINE 1 PATCH: 50 PATCH CUTANEOUS at 19:27

## 2024-12-03 RX ADMIN — ACETAMINOPHEN 975 MG: 325 TABLET, FILM COATED ORAL at 19:27

## 2024-12-03 NOTE — Clinical Note
Karl Villa was seen and treated in our emergency department on 12/3/2024.                Diagnosis:     Karl  may return to work on return date.    He may return on this date: 12/06/2024         If you have any questions or concerns, please don't hesitate to call.      Stevan Childs DO    ______________________________           _______________          _______________  Hospital Representative                              Date                                Time

## 2024-12-04 NOTE — ED ATTENDING ATTESTATION
Final Diagnoses:     1. Closed fracture of multiple ribs of left side, initial encounter    2. Fall (on) (from) other stairs and steps, initial encounter      ED Course as of 12/04/24 1401   Tue Dec 03, 2024   1941 At [facility] now.       I, Darrin Emanuel MD, saw and evaluated the patient. All available labs and X-rays were ordered by me or the resident / non-physician and have been reviewed by myself. I discussed the patient with the resident / non-physician and agree with the resident's / non-physician practitioner's findings and plan as documented in the resident's / non-physician practicitioner's note, except where noted.   At this point, I agree with the current assessment done in the ED.   I was present during key portions of all procedures performed unless otherwise stated.     HPI:  NURSING TRIAGE:    This is a 39 y.o. male presenting for evaluation of rib pain.  He fell down 5 stairs this morning, while going up the steps, hitting the LEFT side.  No LOC, remembers events  Caught on the RIGHT side as the reason that triggered the fall.  Able to get up without assistance  No prodrome.  Got up and walked to apartment, went to sleep and felt fine (at 7am)  Woke up and went to work at 2PM  Noted pain at work, so came in.  No meds used to help at home.   +alcohol abuse (drinks 6-9 drinks/day, 9.5% beers for months)  Declines resources for that.    Chief Complaint   Patient presents with    Rib Pain     Pt. C/o of back and left rib pain after falling down 5 stairs this morning. -HS, -LOC, -thinners. Hurts to cough      PHYSICAL: ASSESSMENT + PLAN:   Pertinent: Chest wall tenderness, tenderness and ecchymosis around T8-T9 on LEFT side  No step-off  No deformity  Wraps around to the front somewhat.   Eating peanut butter jelly sandwhich without distress.  Focal tenderness left lateral lower ribs.  No ecchymosis  No step-off.     General: VS reviewed  Appears in NAD  awake, alert.   Well-nourished, well-developed.  Appears stated age.   Speaking normally in full sentences.   Head: Normocephalic, atraumatic  Eyes: EOM-I. No diplopia.   No hyphema.   No subconjunctival hemorrhages.  Symmetrical lids.   ENT: Atraumatic external nose and ears.    MMM  No malocclusion. No stridor. Normal phonation. No drooling. Normal swallowing.   Neck: No JVD.  CV: No pallor noted  Lungs:   No tachypnea  No respiratory distress  Abd: soft nt nd no rebound/guarding  MSK:   FROM spontaneously  Skin: Dry, intact.   Neuro: Awake, alert, GCS15, CN II-XII grossly intact.   Motor grossly intact.  Psychiatric/Behavioral: interacting normally; appropriate mood/affect.    Exam: deferred    Vitals:    12/03/24 1854   BP: 129/62   BP Location: Left arm   Pulse: 87   Resp: 18   Temp: 98.4 °F (36.9 °C)   SpO2: 98%    Tylenol  Motrin  XR, rib series.      There are no obvious limitations to social determinants of care.   Nursing note reviewed.   Vitals reviewed.   Orders placed by myself and/or advanced practitioner / resident.    Previous chart was reviewed  History obtained from: Patient  Language barrier: None  Limitations to the history obtained: None    Past Medical: Past Surgical:    has no past medical history on file.  has no past surgical history on file.   Social: Cardiac (Echo/Cath)   Social History     Substance and Sexual Activity   Alcohol Use Yes     Social History     Tobacco Use   Smoking Status Never   Smokeless Tobacco Never     Social History     Substance and Sexual Activity   Drug Use No    No results found for this or any previous visit.    No results found for this or any previous visit.    No results found for this or any previous visit.     Labs: Imaging:   Labs Reviewed - No data to display XR ribs with pa chest min 3 views LEFT   Final Result      No evidence of a rib fracture.      No acute cardiopulmonary disease.         Computerized Assisted Algorithm (CAA) may have been used to analyze all applicable images.        "  Workstation performed: [id number]            Medications: Code Status:   Medications   acetaminophen (TYLENOL) tablet 975 mg (975 mg Oral Given 12/3/24 1927)   ibuprofen (MOTRIN) tablet 400 mg (400 mg Oral Given 12/3/24 1927)    Code Status: No Order  Advance Directive and Living Will:      Power of :    POLST:       Orders Placed This Encounter   Procedures    XR ribs with pa chest min 3 views LEFT     Time reflects when diagnosis was documented in both MDM as applicable and the Disposition within this note       Time User Action Codes Description Comment    12/3/2024  8:32 PM Stevan Childs [S22.42XA] Closed fracture of multiple ribs of left side, initial encounter     12/3/2024  8:36 PM Stevan Childs [W10.8XXA] Fall (on) (from) other stairs and steps, initial encounter           ED Disposition       ED Disposition   Discharge    Condition   Stable    Date/Time   Tue Dec 3, 2024  8:32 PM    Comment   Karl Villa discharge to home/self care.                   Follow-up Information       Follow up With Specialties Details Why Contact Info    Yoan Reis,  Family Medicine   [address] [place] [id number] [phone]            Discharge Medication List as of 12/3/2024  8:56 PM        CONTINUE these medications which have NOT CHANGED    Details   pantoprazole (PROTONIX) 40 mg tablet Take 1 tablet (40 mg total) by mouth daily before breakfast, Starting Fri 11/1/2024, Until Wed 4/30/2025, Normal           No discharge procedures on file.  Prior to Admission Medications   Prescriptions Last Dose Informant Patient Reported? Taking?   pantoprazole (PROTONIX) 40 mg tablet   No No   Sig: Take 1 tablet (40 mg total) by mouth daily before breakfast      Facility-Administered Medications: None                        Portions of the record may have been created with voice recognition software. Occasional wrong word or \"sound a like\" substitutions may have occurred due to the inherent " limitations of voice recognition software. Read the chart carefully and recognize, using context, where substitutions have occurred.    Electronically signed by:  Darrin Emanuel

## 2024-12-04 NOTE — DISCHARGE INSTRUCTIONS
You were evaluated in the Emergency Department today for left side rib pain    Take motrin and tylenol together every 6 hours for pain control. Use your incentive spirometry 10 times every hour to help decrease risk of getting pneumonia.     Please schedule an appointment with your primary care physician within the next 2-3 days.    Return to the Emergency Department if you experience worsening or uncontrolled pain, fevers 100.4°F or greater, recurrent vomiting, inability to tolerate food or fluids by mouth, bloody stools or vomit, black or tarry stools, or any other concerning symptoms.    Thank you for choosing us for your care.

## 2024-12-04 NOTE — ED PROVIDER NOTES
Time reflects when diagnosis was documented in both MDM as applicable and the Disposition within this note       Time User Action Codes Description Comment    12/3/2024  8:32 PM AmadeoStevan Add [S22.42XA] Closed fracture of multiple ribs of left side, initial encounter     12/3/2024  8:36 PM Stevan Childs Add [W10.8XXA] Fall (on) (from) other stairs and steps, initial encounter           ED Disposition       ED Disposition   Discharge    Condition   Stable    Date/Time   Tue Dec 3, 2024  8:32 PM    Comment   Karl Whyte Allen discharge to home/self care.                   Assessment & Plan       Medical Decision Making  39 year old male with no significant pmh presents to the ED for evaluation of left back pain s/p fall onto a railing this AM. Pt was running up stairs at 7 am, got caught on the right side and fell backwards onto the railing on the left side of his back. Pt was able to get up and walked back inside and went back to sleep. Pt tried to got up and went to work around 2 pm but pain was unbearable so he left 10 minutes into work. Pt have not tried to take anything for the pain. Pt denies head injury, LOC,     Pt admits to drinking 6 beers (9.5% alcohol) every other night for the past 5 months. No alcohol withdrawals seizure in the past. PT does not want outpatient resources at this time.     PE with TTP to left mid back and left lateral ribs TTP (ribs 9-11?), abdomen soft non tender    Ddx: rib fx, contusion, strain    Will evaluate with rib series and treat symptomatically with tylenol, Toradol, and Lidoderm patch  Upon re-evaluation pt reports improvement in pain.   Xray significant for rib fractures, incentive spirometry provided and helped pt use incentive spirometry, set at 2,000  Pt d/c home with instruction to follow up with pcp. Pt instructed to take pain meds every 6 hours. Strict return precaution given.        Amount and/or Complexity of Data Reviewed  Radiology: ordered.    Risk  OTC  drugs.  Prescription drug management.             Medications   acetaminophen (TYLENOL) tablet 975 mg (975 mg Oral Given 12/3/24 1927)   ibuprofen (MOTRIN) tablet 400 mg (400 mg Oral Given 12/3/24 1927)       ED Risk Strat Scores                                               History of Present Illness       Chief Complaint   Patient presents with    Rib Pain     Pt. C/o of back and left rib pain after falling down 5 stairs this morning. -HS, -LOC, -thinners. Hurts to cough       History reviewed. No pertinent past medical history.   History reviewed. No pertinent surgical history.   Family History   Problem Relation Age of Onset    No Known Problems Mother     No Known Problems Father     Cancer Maternal Grandmother     Diabetes Paternal Grandmother       Social History     Tobacco Use    Smoking status: Never    Smokeless tobacco: Never   Vaping Use    Vaping status: Never Used   Substance Use Topics    Alcohol use: Yes    Drug use: No      E-Cigarette/Vaping    E-Cigarette Use Never User       E-Cigarette/Vaping Substances    Nicotine No     THC No     CBD No     Flavoring No     Other No     Unknown No       I have reviewed and agree with the history as documented.     HPI    Review of Systems   Constitutional:  Negative for appetite change, chills, diaphoresis, fever and unexpected weight change.   HENT:  Negative for dental problem, ear pain, facial swelling, sore throat and trouble swallowing.    Eyes:  Negative for pain and visual disturbance.   Respiratory:  Negative for cough, chest tightness and shortness of breath.    Cardiovascular:  Negative for chest pain, palpitations and leg swelling.   Gastrointestinal:  Negative for abdominal distention, abdominal pain, constipation, diarrhea, nausea and vomiting.   Endocrine: Negative for polyuria.   Genitourinary:  Negative for difficulty urinating, dysuria and hematuria.   Musculoskeletal:  Negative for arthralgias and back pain.        Left side rib pain    Skin:  Negative for color change and rash.   Neurological:  Negative for dizziness, seizures, syncope, light-headedness and headaches.   Psychiatric/Behavioral:  Negative for confusion.    All other systems reviewed and are negative.          Objective       ED Triage Vitals [12/03/24 1854]   Temperature Pulse Blood Pressure Respirations SpO2 Patient Position - Orthostatic VS   98.4 °F (36.9 °C) 87 129/62 18 98 % Sitting      Temp src Heart Rate Source BP Location FiO2 (%) Pain Score    -- Monitor Left arm -- 8      Vitals      Date and Time Temp Pulse SpO2 Resp BP Pain Score FACES Pain Rating User   12/03/24 1927 -- -- -- -- -- 9 -- AS   12/03/24 1854 98.4 °F (36.9 °C) 87 98 % 18 129/62 8 -- BK            Physical Exam  Vitals and nursing note reviewed.   Constitutional:       General: He is not in acute distress.     Appearance: Normal appearance. He is well-developed. He is not ill-appearing.   HENT:      Head: Normocephalic and atraumatic.      Right Ear: External ear normal.      Left Ear: External ear normal.      Nose: Nose normal.      Mouth/Throat:      Mouth: Mucous membranes are moist.   Eyes:      General: No scleral icterus.        Right eye: No discharge.      Extraocular Movements: Extraocular movements intact.      Conjunctiva/sclera: Conjunctivae normal.   Cardiovascular:      Rate and Rhythm: Normal rate and regular rhythm.      Pulses: Normal pulses.      Heart sounds: No murmur heard.  Pulmonary:      Effort: Pulmonary effort is normal. No respiratory distress.      Breath sounds: Normal breath sounds. No wheezing.   Chest:      Chest wall: No tenderness.   Abdominal:      General: Abdomen is flat. There is no distension.      Palpations: Abdomen is soft.      Tenderness: There is no abdominal tenderness.   Musculoskeletal:         General: No swelling, deformity or signs of injury. Normal range of motion.        Arms:       Cervical back: Normal range of motion and neck supple. No rigidity or  tenderness.      Right lower leg: No edema.      Left lower leg: No edema.   Skin:     General: Skin is warm and dry.      Capillary Refill: Capillary refill takes less than 2 seconds.   Neurological:      General: No focal deficit present.      Mental Status: He is alert and oriented to person, place, and time.      Motor: No weakness.   Psychiatric:         Mood and Affect: Mood normal.         Results Reviewed       None            XR ribs with pa chest min 3 views LEFT   Final Interpretation by Fortino Cannon MD (12/04 0753)      No evidence of a rib fracture.      No acute cardiopulmonary disease.         Computerized Assisted Algorithm (CAA) may have been used to analyze all applicable images.         Workstation performed: [id number]             Procedures    ED Medication and Procedure Management   Prior to Admission Medications   Prescriptions Last Dose Informant Patient Reported? Taking?   pantoprazole (PROTONIX) 40 mg tablet   No No   Sig: Take 1 tablet (40 mg total) by mouth daily before breakfast      Facility-Administered Medications: None     Discharge Medication List as of 12/3/2024  8:56 PM        CONTINUE these medications which have NOT CHANGED    Details   pantoprazole (PROTONIX) 40 mg tablet Take 1 tablet (40 mg total) by mouth daily before breakfast, Starting Fri 11/1/2024, Until Wed 4/30/2025, Normal           No discharge procedures on file.  ED SEPSIS DOCUMENTATION   Time reflects when diagnosis was documented in both MDM as applicable and the Disposition within this note       Time User Action Codes Description Comment    12/3/2024  8:32 PM Stevan Childs [S22.42XA] Closed fracture of multiple ribs of left side, initial encounter     12/3/2024  8:36 PM Stevan Childs [W10.8XXA] Fall (on) (from) other stairs and steps, initial encounter                  Stevan Childs DO  12/07/24 2136

## 2024-12-09 ENCOUNTER — OFFICE VISIT (OUTPATIENT)
Dept: FAMILY MEDICINE CLINIC | Facility: CLINIC | Age: 39
End: 2024-12-09
Payer: COMMERCIAL

## 2024-12-09 VITALS
BODY MASS INDEX: 22.85 KG/M2 | WEIGHT: 159.6 LBS | HEIGHT: 70 IN | OXYGEN SATURATION: 98 % | SYSTOLIC BLOOD PRESSURE: 134 MMHG | RESPIRATION RATE: 18 BRPM | HEART RATE: 67 BPM | DIASTOLIC BLOOD PRESSURE: 68 MMHG | TEMPERATURE: 97.6 F

## 2024-12-09 DIAGNOSIS — S20.212A CONTUSION OF RIB ON LEFT SIDE, INITIAL ENCOUNTER: Primary | ICD-10-CM

## 2024-12-09 PROCEDURE — 99213 OFFICE O/P EST LOW 20 MIN: CPT | Performed by: FAMILY MEDICINE

## 2024-12-09 NOTE — LETTER
December 9, 2024     Patient: Karl Villa  YOB: 1985  Date of Visit: 12/9/2024      To Whom it May Concern:    Karl Villa is under my professional care. Karl was seen in my office on 12/9/2024. Karl may return to work on 12/16/24. Please excuse his absence due to injury the two weeks prior. Please allow light duty upon his return and can increase to full duty as tolerated based on symptoms .    If you have any questions or concerns, please don't hesitate to call.         Sincerely,          Yoan Reis,         CC: No Recipients

## 2024-12-09 NOTE — PROGRESS NOTES
"Name: Karl Villa      : 1985      MRN: [id number]  Encounter Provider: Yoan Reis DO  Encounter Date: 2024   Encounter department: [facility]  :  Assessment & Plan  Contusion of rib on left side, initial encounter  Increase OTC analgesics to therapeutic dose, can consider topical lidocaine as well. Feeling slight improvement already. Work note provided for return on  on light duty. Follow up as needed.              History of Present Illness     HPI  Was seen in the ED on 12/3 for rib injury. Was told he had rib fractures but XR showed no fracture. Still feeling sharp pains with sniff, coughing. Taking Ibuprofen and Acetaminophen.     Review of Systems       Objective   /68 (BP Location: Left arm, Patient Position: Sitting, Cuff Size: Standard)   Pulse 67   Temp 97.6 °F (36.4 °C) (Temporal)   Resp 18   Ht 5' 10\" (1.778 m)   Wt 72.4 kg (159 lb 9.6 oz)   SpO2 98%   BMI 22.90 kg/m²      Physical Exam  Vitals reviewed.   Constitutional:       Appearance: Normal appearance.   Cardiovascular:      Rate and Rhythm: Normal rate.   Pulmonary:      Effort: Pulmonary effort is normal.   Abdominal:      General: Abdomen is flat.   Musculoskeletal:      Comments: Rib tenderness L side, no bruising   Neurological:      Mental Status: He is alert.   Psychiatric:         Mood and Affect: Mood normal.         Behavior: Behavior normal.         "